# Patient Record
Sex: MALE | Race: ASIAN | NOT HISPANIC OR LATINO | Employment: UNEMPLOYED
[De-identification: names, ages, dates, MRNs, and addresses within clinical notes are randomized per-mention and may not be internally consistent; named-entity substitution may affect disease eponyms.]

---

## 2019-08-24 ENCOUNTER — RECORDS - HEALTHEAST (OUTPATIENT)
Dept: ADMINISTRATIVE | Facility: OTHER | Age: 34
End: 2019-08-24

## 2020-11-12 ENCOUNTER — HOSPITAL ENCOUNTER (EMERGENCY)
Facility: CLINIC | Age: 35
Discharge: LEFT BEFORE DISCHARGE | End: 2020-11-12

## 2020-11-12 VITALS
WEIGHT: 115 LBS | OXYGEN SATURATION: 99 % | BODY MASS INDEX: 19.16 KG/M2 | HEART RATE: 100 BPM | SYSTOLIC BLOOD PRESSURE: 108 MMHG | HEIGHT: 65 IN | RESPIRATION RATE: 16 BRPM | TEMPERATURE: 97.8 F | DIASTOLIC BLOOD PRESSURE: 63 MMHG

## 2020-11-12 ASSESSMENT — MIFFLIN-ST. JEOR: SCORE: 1388.52

## 2020-11-12 NOTE — ED PROVIDER NOTES
"      [place] EMERGENCY DEPARTMENT ([facility])  November 12, 2020  History   No chief complaint on file.    HPI  Man Maria is a (no history found) 35 year old male who presents to the ED today for an exam. ***    I have reviewed the Medications, Allergies, Past Medical and Surgical History, and Social History in the [facility] system.  PAST MEDICAL HISTORY: No past medical history on file.    PAST SURGICAL HISTORY: No past surgical history on file.    Past medical history, past surgical history, medications, and allergies were reviewed with the patient. Additional pertinent items: {NONE:345291::\"None\"}    FAMILY HISTORY: No family history on file.    SOCIAL HISTORY:   Social History     Tobacco Use     Smoking status: Not on file   Substance Use Topics     Alcohol use: Not on file     Social history was reviewed with the patient. Additional pertinent items: {NONE:964529::\"None\"}      Patient's Medications    No medications on file        Not on File     Review of Systems  {Complete vs limited ROS:120933::\"A complete review of systems was performed with pertinent positives and negatives noted in the HPI, and all other systems negative.\"}    Physical Exam          Physical Exam    ED Course        Procedures        {EKG done?:139371::\" \"}    {ed addendum:687640::\" \"}  {trauma activation or Fall?:453360::\" \"}  {Sepsis/Septic Shock/Stemi/Stroke:719520::\" \"}       No results found for this or any previous visit (from the past 24 hour(s)).  Medications - No data to display          Assessments & Plan (with Medical Decision Making)   ***    I have reviewed the nursing notes.    I have reviewed the findings, diagnosis, plan and need for follow up with the patient.    New Prescriptions    No medications on file       Final diagnoses:   None       11/12/2020   [facility] EMERGENCY DEPARTMENT  "

## 2020-11-13 NOTE — ED NOTES
Pt no longer present in ED waiting room. Considered LWBS after triage. Last seen per writer sitting in waiting room, awake and alert reading.